# Patient Record
Sex: MALE | Race: WHITE | NOT HISPANIC OR LATINO
[De-identification: names, ages, dates, MRNs, and addresses within clinical notes are randomized per-mention and may not be internally consistent; named-entity substitution may affect disease eponyms.]

---

## 2017-10-18 ENCOUNTER — CHARTING TRANS (OUTPATIENT)
Dept: OTHER | Age: 82
End: 2017-10-18

## 2018-11-02 VITALS
HEART RATE: 89 BPM | HEIGHT: 65 IN | OXYGEN SATURATION: 98 % | BODY MASS INDEX: 25.01 KG/M2 | WEIGHT: 150.1 LBS | RESPIRATION RATE: 17 BRPM | TEMPERATURE: 99.1 F

## 2021-01-12 ENCOUNTER — OFFICE VISIT (OUTPATIENT)
Dept: SURGERY | Facility: CLINIC | Age: 86
End: 2021-01-12
Payer: MEDICARE

## 2021-01-12 VITALS — WEIGHT: 145 LBS | HEART RATE: 89 BPM | SYSTOLIC BLOOD PRESSURE: 149 MMHG | DIASTOLIC BLOOD PRESSURE: 69 MMHG

## 2021-01-12 DIAGNOSIS — N20.0 KIDNEY STONES: Primary | ICD-10-CM

## 2021-01-12 PROCEDURE — 3078F DIAST BP <80 MM HG: CPT | Performed by: NURSE PRACTITIONER

## 2021-01-12 PROCEDURE — 3077F SYST BP >= 140 MM HG: CPT | Performed by: NURSE PRACTITIONER

## 2021-01-12 PROCEDURE — 99203 OFFICE O/P NEW LOW 30 MIN: CPT | Performed by: NURSE PRACTITIONER

## 2021-01-12 RX ORDER — VALSARTAN 80 MG/1
40 TABLET ORAL DAILY
COMMUNITY
Start: 2020-12-02

## 2021-01-12 RX ORDER — AMLODIPINE BESYLATE 10 MG/1
TABLET ORAL
COMMUNITY
Start: 2020-12-29

## 2021-01-12 RX ORDER — LEVOFLOXACIN 250 MG/1
250 TABLET ORAL EVERY 24 HOURS
COMMUNITY
Start: 2021-01-06 | End: 2021-01-15

## 2021-01-12 RX ORDER — INHALER, ASSIST DEVICES
1 SPACER (EA) MISCELLANEOUS
COMMUNITY
Start: 2020-11-19

## 2021-01-12 RX ORDER — ATORVASTATIN CALCIUM 20 MG/1
20 TABLET, FILM COATED ORAL NIGHTLY
COMMUNITY
Start: 2020-10-07

## 2021-01-12 RX ORDER — TAMSULOSIN HYDROCHLORIDE 0.4 MG/1
0.4 CAPSULE ORAL DAILY
COMMUNITY
Start: 2019-07-29

## 2021-01-12 RX ORDER — ALBUTEROL SULFATE 90 UG/1
2 AEROSOL, METERED RESPIRATORY (INHALATION)
COMMUNITY
Start: 2020-11-19

## 2021-01-12 NOTE — PROGRESS NOTES
HPI:    Patient ID: Mark Domingo is a 81year old male. HPI    Patient is a 81year old male who presents to the clinic for an evaluation regarding kidney stones. Past medical history of pulmonary embolism.       Patient presents today for an opinion however goes to [facility] where his fiance lives to sleep at night.               Review of Systems      Current Outpatient Medications   Medication Sig Dispense Refill   • amLODIPine Besylate 10 MG Oral Tab TAKE 1 TABLET BY MOUTH DAILY FOR HIGH BLO regarding kidney stones. Patient with a left ureteral stent that was placed 1/1/21 due to multiple left ureteral obstructing stones.   Reports he is feeling well since then and plans to follow up with his urologist however would like a second opinion as

## 2021-09-10 PROBLEM — N13.2 HYDRONEPHROSIS WITH RENAL AND URETERAL CALCULOUS OBSTRUCTION: Status: ACTIVE | Noted: 2021-02-03
